# Patient Record
Sex: MALE | Race: WHITE
[De-identification: names, ages, dates, MRNs, and addresses within clinical notes are randomized per-mention and may not be internally consistent; named-entity substitution may affect disease eponyms.]

---

## 2021-07-16 ENCOUNTER — HOSPITAL ENCOUNTER (OUTPATIENT)
Dept: HOSPITAL 75 - OCC | Age: 51
End: 2021-07-16
Attending: NURSE PRACTITIONER

## 2021-07-16 DIAGNOSIS — S89.91XA: Primary | ICD-10-CM

## 2021-07-16 DIAGNOSIS — S83.241A: Primary | ICD-10-CM

## 2021-07-16 DIAGNOSIS — M25.461: ICD-10-CM

## 2021-07-16 DIAGNOSIS — M17.11: ICD-10-CM
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
## 2021-07-16 PROCEDURE — 73721 MRI JNT OF LWR EXTRE W/O DYE: CPT

## 2021-07-16 PROCEDURE — 73562 X-RAY EXAM OF KNEE 3: CPT

## 2021-07-16 NOTE — DIAGNOSTIC IMAGING REPORT
INDICATION: Right knee injury from a fall.



3 views of the right knee show no fracture, dislocation or other

acute abnormalities. There are some degenerative changes with

joint space narrowing of the medial tibiofemoral joint

compartment. There are osteophytes forming at the medial

articular margin. There are also some degenerative changes of the

patellofemoral joint.



IMPRESSION: Moderate degenerative changes of the medial

tibiofemoral joint compartment. Mild degenerative changes of the

patellofemoral joint. No acute abnormality seen.



Dictated by: 



  Dictated on workstation # [id number]

## 2021-07-16 NOTE — DIAGNOSTIC IMAGING REPORT
EXAMINATION: Magnetic resonance imaging of the right knee without

intravenous contrast



DATE: July 16, 2021.



COMPARISON: Right knee radiographs July 16, 2021.



INDICATION: 50-year-old male, right knee pain after fall.



TECHNIQUE: Multiplanar, multisequence non contrast enhanced MR

imaging was accomplished.



FINDINGS:



MENISCI:

There is an oblique tear involving the anterior horn, body, and

posterior horn of the medial meniscus. There is roughly 50%

volume loss of the body and posterior horn of the medial

meniscus.



The lateral meniscus is intact.



LIGAMENTS AND TENDONS:

The anterior and posterior cruciate ligaments are intact.



The medial collateral ligament is intact.



The iliotibial band, mid third lateral capsular ligament, fibular

collateral ligament, biceps femoris tendon and conjoined tendon

are intact.



The quadriceps tendon and patella ligament are intact.



JOINT:

There are broad areas of full-thickness cartilage loss of the

medial compartment with bone-on-bone articulation. There is a

small knee joint effusion without identified intra-articular body

or prominent synovitis.



BONE:

There is degenerative related marrow edema adjacent to the medial

compartment. There is no acute fracture. Edema-like signal in the

region of the anterior aspect of the proximal tibia underlying

the tibial spines may be mechanically related or secondary to a

bone contusion. There is no evidence of osteonecrosis.



BURSAE AND SOFT TISSUES:

There is no Baker's cyst. There is a multiloculated ganglion cyst

adjacent to the femoral attachment site of the medial head of

gastrocnemius measuring 2.9 x 1.3 x 2.0 cm in size. There is

nonspecific prepatellar subcutaneous edema.



IMPRESSION: 

1. Oblique tear involving the entire medial meniscus with roughly

50% volume loss of the body and posterior horn of the medial

meniscus.

2. Intact lateral meniscus.

3. Intact anterior and posterior cruciate ligaments. Additional

ligaments and tendons are intact.

4. Severe medial compartment osteoarthritis with small knee joint

effusion. No identified intra-articular body or prominent

synovitis.

5. Edema-like signal in the proximal tibia underlying the tibial

spines may be mechanically related or secondary to bone

contusion. No acute fracture or evidence of osteonecrosis.



Dictated by: 



  Dictated on workstation # [id number]